# Patient Record
Sex: FEMALE | Race: BLACK OR AFRICAN AMERICAN | NOT HISPANIC OR LATINO | Employment: FULL TIME | URBAN - METROPOLITAN AREA
[De-identification: names, ages, dates, MRNs, and addresses within clinical notes are randomized per-mention and may not be internally consistent; named-entity substitution may affect disease eponyms.]

---

## 2017-07-25 DIAGNOSIS — O09.529 AMA (ADVANCED MATERNAL AGE) MULTIGRAVIDA 35+, UNSPECIFIED TRIMESTER: ICD-10-CM

## 2017-07-25 DIAGNOSIS — Z36.89 ENCOUNTER FOR FETAL ANATOMIC SURVEY: Primary | ICD-10-CM

## 2017-08-01 ENCOUNTER — OFFICE VISIT (OUTPATIENT)
Dept: MATERNAL FETAL MEDICINE | Facility: CLINIC | Age: 37
End: 2017-08-01
Payer: MEDICAID

## 2017-08-01 VITALS
DIASTOLIC BLOOD PRESSURE: 65 MMHG | BODY MASS INDEX: 29.41 KG/M2 | HEIGHT: 63 IN | SYSTOLIC BLOOD PRESSURE: 106 MMHG | WEIGHT: 166 LBS

## 2017-08-01 DIAGNOSIS — O09.522 ELDERLY MULTIGRAVIDA IN SECOND TRIMESTER: ICD-10-CM

## 2017-08-01 DIAGNOSIS — D57.3 SICKLE CELL TRAIT: ICD-10-CM

## 2017-08-01 DIAGNOSIS — Z36.89 ENCOUNTER FOR FETAL ANATOMIC SURVEY: ICD-10-CM

## 2017-08-01 DIAGNOSIS — O09.42 GRAND MULTIPARITY WITH CURRENT PREGNANCY IN SECOND TRIMESTER: ICD-10-CM

## 2017-08-01 DIAGNOSIS — O09.529 AMA (ADVANCED MATERNAL AGE) MULTIGRAVIDA 35+, UNSPECIFIED TRIMESTER: ICD-10-CM

## 2017-08-01 PROCEDURE — 76811 OB US DETAILED SNGL FETUS: CPT | Mod: S$GLB,,, | Performed by: OBSTETRICS & GYNECOLOGY

## 2017-08-01 PROCEDURE — 99204 OFFICE O/P NEW MOD 45 MIN: CPT | Mod: TH,25,S$GLB, | Performed by: OBSTETRICS & GYNECOLOGY

## 2017-08-01 NOTE — PROGRESS NOTES
Chief complaint: Advanced maternal age in pregnancy    Provider requesting consultation: VIDHYA Hayden CNM    Age based risk for Down Syndrome at this gestational age is approximately 1 in 190    Aneuploidy screening this pregnancy not done    A detailed fetal anatomical ultrasound was completed today.  See official report in the imaging section of [facility].  Ultrasound noted no obvious fetal abnormalities.  Ultrasound findings consistent with established dating.        Advanced maternal age counseling and recommendations:    Today I counseled the patient on the relationship between maternal age and genetic aneuploidy.  We discussed the risks and benefits of screening tests versus definitive genetic testing (amniocentesis). She was counseled about her specific age related risk of Down Syndrome. We discussed the limitations of ultrasound in the definitive diagnosis of Down Syndrome and we discussed amniocentesis as providing definitive diagnosis.  I quoted the patient a 1 in 500 procedure related risk of fetal loss with genetic amniocenetesis.  I also discussed with the patient the option of non-invasive prenatal testing (NIPT) (ex. Materni T21) including the sensitivity and specificity of the test.  Her questions were answered and after today's consultation she did not want to pursue amniocentesis.  She did not want to pursue NIPT.    Recommendations from our [facility] group at [facility]:  -For women who are of advanced maternal age at the time of delivery we recommend a follow up fetal ultrasound at 32-34 weeks for interval fetal growth and well being (biophysical profile).    We also discussed her sickle cell trait.    I reviewed with patient the autsomal recessive nature of sickle cell disease.  I also reviewed the pathphysiology of this hemoglobinopathy with the patient.  I relayed to her the carrier rate of sickle cell trait in the -American population is 1 in 16.  Since the carrier status of the father ()  of the baby is unknown, I quoted the patient an approximate 1 in 48 risk of sickle cell disease of this fetus.  If testing is completed of the father of the baby and he is noted to be positive then a 1 in 4 chance was discussed.  Options of prenatal diagnosis were discussed yet patient will await testing of the .    Results of today's ultrasound discussed with patient.  I spent 45 minutes with patient today over half of which was in consultation separate of her ultrasound examination.     Referring physician to receive copy of today's consultation via electronic medical record.